# Patient Record
Sex: FEMALE
[De-identification: names, ages, dates, MRNs, and addresses within clinical notes are randomized per-mention and may not be internally consistent; named-entity substitution may affect disease eponyms.]

---

## 2018-02-23 ENCOUNTER — HOSPITAL ENCOUNTER (OUTPATIENT)
Dept: HOSPITAL 93 - OFIC 805 | Age: 73
Discharge: HOME | End: 2018-02-23
Attending: OTOLARYNGOLOGY
Payer: COMMERCIAL

## 2018-02-23 VITALS — HEIGHT: 60 IN | WEIGHT: 102 LBS | BODY MASS INDEX: 20.03 KG/M2

## 2018-02-23 DIAGNOSIS — H61.23: ICD-10-CM

## 2018-02-23 DIAGNOSIS — H69.83: ICD-10-CM

## 2018-02-23 DIAGNOSIS — H90.3: Primary | ICD-10-CM

## 2018-02-23 DIAGNOSIS — J31.0: ICD-10-CM

## 2018-04-05 ENCOUNTER — HOSPITAL ENCOUNTER (INPATIENT)
Dept: HOSPITAL 93 - O/R | Age: 73
LOS: 13 days | Discharge: HOME | DRG: 460 | End: 2018-04-18
Attending: ORTHOPAEDIC SURGERY | Admitting: ORTHOPAEDIC SURGERY
Payer: COMMERCIAL

## 2018-04-05 VITALS — HEIGHT: 59 IN | WEIGHT: 102 LBS | BODY MASS INDEX: 20.56 KG/M2

## 2018-04-05 DIAGNOSIS — E13.42: ICD-10-CM

## 2018-04-05 DIAGNOSIS — I82.493: ICD-10-CM

## 2018-04-05 DIAGNOSIS — I10: ICD-10-CM

## 2018-04-05 DIAGNOSIS — M43.17: ICD-10-CM

## 2018-04-05 DIAGNOSIS — M81.0: ICD-10-CM

## 2018-04-05 DIAGNOSIS — E13.319: ICD-10-CM

## 2018-04-05 DIAGNOSIS — Z79.4: ICD-10-CM

## 2018-04-05 DIAGNOSIS — M50.00: ICD-10-CM

## 2018-04-05 DIAGNOSIS — M48.061: Primary | ICD-10-CM

## 2018-04-05 DIAGNOSIS — E03.8: ICD-10-CM

## 2018-04-05 DIAGNOSIS — M47.817: ICD-10-CM

## 2018-04-05 DIAGNOSIS — Z95.1: ICD-10-CM

## 2018-04-05 DIAGNOSIS — E78.4: ICD-10-CM

## 2018-04-17 PROCEDURE — 0SG30AJ FUSION OF LUMBOSACRAL JOINT WITH INTERBODY FUSION DEVICE, POSTERIOR APPROACH, ANTERIOR COLUMN, OPEN APPROACH: ICD-10-PCS | Performed by: ORTHOPAEDIC SURGERY

## 2018-04-17 PROCEDURE — 07DS3ZZ EXTRACTION OF VERTEBRAL BONE MARROW, PERCUTANEOUS APPROACH: ICD-10-PCS | Performed by: ORTHOPAEDIC SURGERY

## 2018-04-17 PROCEDURE — 00NY0ZZ RELEASE LUMBAR SPINAL CORD, OPEN APPROACH: ICD-10-PCS | Performed by: ORTHOPAEDIC SURGERY

## 2018-04-17 PROCEDURE — 0ST40ZZ RESECTION OF LUMBOSACRAL DISC, OPEN APPROACH: ICD-10-PCS | Performed by: ORTHOPAEDIC SURGERY

## 2018-08-20 ENCOUNTER — HOSPITAL ENCOUNTER (OUTPATIENT)
Dept: HOSPITAL 93 - NUCLEAR | Age: 73
Discharge: HOME | End: 2018-08-20
Attending: INTERNAL MEDICINE
Payer: COMMERCIAL

## 2018-08-20 DIAGNOSIS — I87.2: Primary | ICD-10-CM

## 2018-08-29 ENCOUNTER — HOSPITAL ENCOUNTER (OUTPATIENT)
Dept: HOSPITAL 93 - NUCLEAR | Age: 73
Discharge: HOME | End: 2018-08-29
Attending: INTERNAL MEDICINE
Payer: COMMERCIAL

## 2018-08-29 DIAGNOSIS — I73.9: Primary | ICD-10-CM

## 2018-08-29 DIAGNOSIS — I70.213: ICD-10-CM

## 2018-08-29 DIAGNOSIS — E11.51: ICD-10-CM

## 2019-08-12 ENCOUNTER — HOSPITAL ENCOUNTER (EMERGENCY)
Dept: HOSPITAL 93 - ER | Age: 74
Discharge: HOME | End: 2019-08-12
Payer: COMMERCIAL

## 2019-08-12 VITALS — WEIGHT: 100 LBS | HEIGHT: 57 IN | BODY MASS INDEX: 21.57 KG/M2

## 2019-08-12 DIAGNOSIS — J06.9: Primary | ICD-10-CM

## 2019-08-12 DIAGNOSIS — R05: ICD-10-CM

## 2019-08-13 ENCOUNTER — HOSPITAL ENCOUNTER (INPATIENT)
Dept: HOSPITAL 93 - ER | Age: 74
LOS: 11 days | Discharge: HOME | DRG: 281 | End: 2019-08-24
Attending: SPECIALIST | Admitting: SPECIALIST
Payer: COMMERCIAL

## 2019-08-13 VITALS — WEIGHT: 8.82 LBS | HEIGHT: 55 IN | BODY MASS INDEX: 2.04 KG/M2

## 2019-08-13 DIAGNOSIS — M43.17: ICD-10-CM

## 2019-08-13 DIAGNOSIS — E78.49: ICD-10-CM

## 2019-08-13 DIAGNOSIS — E86.0: ICD-10-CM

## 2019-08-13 DIAGNOSIS — I50.43: ICD-10-CM

## 2019-08-13 DIAGNOSIS — I11.0: Primary | ICD-10-CM

## 2019-08-13 DIAGNOSIS — D68.318: ICD-10-CM

## 2019-08-13 DIAGNOSIS — E03.8: ICD-10-CM

## 2019-08-13 DIAGNOSIS — R31.0: ICD-10-CM

## 2019-08-13 DIAGNOSIS — N17.8: ICD-10-CM

## 2019-08-13 DIAGNOSIS — J98.11: ICD-10-CM

## 2019-08-13 DIAGNOSIS — I21.3: ICD-10-CM

## 2019-08-13 DIAGNOSIS — Z95.1: ICD-10-CM

## 2019-08-13 DIAGNOSIS — I25.10: ICD-10-CM

## 2019-08-13 DIAGNOSIS — E87.8: ICD-10-CM

## 2019-08-13 DIAGNOSIS — E11.65: ICD-10-CM

## 2019-08-13 DIAGNOSIS — Z79.4: ICD-10-CM

## 2019-08-13 DIAGNOSIS — I08.3: ICD-10-CM

## 2019-08-13 DIAGNOSIS — S06.2X0S: ICD-10-CM

## 2019-08-13 DIAGNOSIS — Z79.01: ICD-10-CM

## 2019-08-14 PROCEDURE — 0T9B70Z DRAINAGE OF BLADDER WITH DRAINAGE DEVICE, VIA NATURAL OR ARTIFICIAL OPENING: ICD-10-PCS | Performed by: SPECIALIST

## 2019-08-14 PROCEDURE — B246ZZZ ULTRASONOGRAPHY OF RIGHT AND LEFT HEART: ICD-10-PCS | Performed by: SPECIALIST

## 2019-08-14 PROCEDURE — 3E0F7GC INTRODUCTION OF OTHER THERAPEUTIC SUBSTANCE INTO RESPIRATORY TRACT, VIA NATURAL OR ARTIFICIAL OPENING: ICD-10-PCS | Performed by: SPECIALIST

## 2019-08-14 PROCEDURE — 4A12X4Z MONITORING OF CARDIAC ELECTRICAL ACTIVITY, EXTERNAL APPROACH: ICD-10-PCS | Performed by: SPECIALIST

## 2019-08-16 PROCEDURE — BW28ZZZ COMPUTERIZED TOMOGRAPHY (CT SCAN) OF HEAD: ICD-10-PCS | Performed by: PSYCHIATRY & NEUROLOGY

## 2019-10-02 ENCOUNTER — HOSPITAL ENCOUNTER (INPATIENT)
Dept: HOSPITAL 93 - ER | Age: 74
LOS: 8 days | Discharge: NURSING, REHAB OR LONG-TERM CARE | DRG: 190 | End: 2019-10-10
Attending: SPECIALIST | Admitting: SPECIALIST
Payer: COMMERCIAL

## 2019-10-02 VITALS — HEIGHT: 59 IN | BODY MASS INDEX: 17.94 KG/M2 | WEIGHT: 89 LBS

## 2019-10-02 DIAGNOSIS — E87.8: ICD-10-CM

## 2019-10-02 DIAGNOSIS — I11.0: ICD-10-CM

## 2019-10-02 DIAGNOSIS — J20.9: ICD-10-CM

## 2019-10-02 DIAGNOSIS — I08.3: ICD-10-CM

## 2019-10-02 DIAGNOSIS — E10.65: ICD-10-CM

## 2019-10-02 DIAGNOSIS — Z74.01: ICD-10-CM

## 2019-10-02 DIAGNOSIS — I21.4: ICD-10-CM

## 2019-10-02 DIAGNOSIS — E86.0: ICD-10-CM

## 2019-10-02 DIAGNOSIS — J45.41: ICD-10-CM

## 2019-10-02 DIAGNOSIS — J44.0: ICD-10-CM

## 2019-10-02 DIAGNOSIS — E03.8: ICD-10-CM

## 2019-10-02 DIAGNOSIS — N17.8: ICD-10-CM

## 2019-10-02 DIAGNOSIS — I25.810: ICD-10-CM

## 2019-10-02 DIAGNOSIS — R31.0: ICD-10-CM

## 2019-10-02 DIAGNOSIS — Z79.4: ICD-10-CM

## 2019-10-02 DIAGNOSIS — J44.1: Primary | ICD-10-CM

## 2019-10-02 DIAGNOSIS — M62.81: ICD-10-CM

## 2019-10-02 DIAGNOSIS — Z95.810: ICD-10-CM

## 2019-10-02 DIAGNOSIS — J45.40: ICD-10-CM

## 2019-10-02 DIAGNOSIS — I50.43: ICD-10-CM

## 2019-10-02 PROCEDURE — 3E0F7GC INTRODUCTION OF OTHER THERAPEUTIC SUBSTANCE INTO RESPIRATORY TRACT, VIA NATURAL OR ARTIFICIAL OPENING: ICD-10-PCS | Performed by: SPECIALIST

## 2019-10-02 PROCEDURE — 4A12X4Z MONITORING OF CARDIAC ELECTRICAL ACTIVITY, EXTERNAL APPROACH: ICD-10-PCS | Performed by: INTERNAL MEDICINE

## 2019-10-02 PROCEDURE — 4A033R1 MEASUREMENT OF ARTERIAL SATURATION, PERIPHERAL, PERCUTANEOUS APPROACH: ICD-10-PCS | Performed by: SPECIALIST

## 2019-10-02 NOTE — NUR
PTE ES CONECTADA A MONITOR CARDIACO Y OXIMETRIA DE PUL[place]. SE ADMINISTRA CANULA
NASAL A 3 LITROS. SE MANTIENE A PTE EN OBSERVACION POR CAMBIOS. PENDIENTE
TERAPIA RESPIRATORIA Y ABG'S.

## 2019-10-02 NOTE — NUR
SE  RECIBE PTE FEMENIA DE 74 YRS ALERTA CONCOIENTE Y TRANQUILA EN CAMA EN LA
UNIDAD DE SEC-K PTE ES EVALUADA POR EL DANIELLE MCCAIN QUIEN ORDENA TRATAMIENTO Y SE
EJECUTA ORDEN MEDICA POR MS ROTHMAN RN.
SE MANTIENE BAJO OBSERVACION POR CAMBIOS.

## 2019-10-03 PROCEDURE — B246ZZZ ULTRASONOGRAPHY OF RIGHT AND LEFT HEART: ICD-10-PCS | Performed by: INTERNAL MEDICINE

## 2019-11-14 ENCOUNTER — HOSPITAL ENCOUNTER (EMERGENCY)
Dept: HOSPITAL 93 - ER | Age: 74
Discharge: HOME | End: 2019-11-14
Payer: COMMERCIAL

## 2019-11-14 VITALS — HEIGHT: 55 IN | WEIGHT: 89 LBS | BODY MASS INDEX: 20.6 KG/M2

## 2019-11-14 DIAGNOSIS — R42: Primary | ICD-10-CM
